# Patient Record
Sex: MALE | Race: WHITE | NOT HISPANIC OR LATINO | Employment: UNEMPLOYED | URBAN - METROPOLITAN AREA
[De-identification: names, ages, dates, MRNs, and addresses within clinical notes are randomized per-mention and may not be internally consistent; named-entity substitution may affect disease eponyms.]

---

## 2022-04-29 ENCOUNTER — APPOINTMENT (EMERGENCY)
Dept: RADIOLOGY | Facility: HOSPITAL | Age: 62
End: 2022-04-29
Payer: COMMERCIAL

## 2022-04-29 ENCOUNTER — HOSPITAL ENCOUNTER (EMERGENCY)
Facility: HOSPITAL | Age: 62
Discharge: HOME | End: 2022-04-29
Attending: EMERGENCY MEDICINE
Payer: COMMERCIAL

## 2022-04-29 VITALS
HEART RATE: 99 BPM | SYSTOLIC BLOOD PRESSURE: 199 MMHG | OXYGEN SATURATION: 98 % | TEMPERATURE: 99.2 F | RESPIRATION RATE: 20 BRPM | DIASTOLIC BLOOD PRESSURE: 103 MMHG

## 2022-04-29 DIAGNOSIS — F10.239 ALCOHOL WITHDRAWAL (HCC): Primary | ICD-10-CM

## 2022-04-29 LAB
2HR DELTA HS TROPONIN: 3 NG/L
ALBUMIN SERPL BCP-MCNC: 4.5 G/DL (ref 3.5–5)
ALP SERPL-CCNC: 123 U/L (ref 46–116)
ALT SERPL W P-5'-P-CCNC: 125 U/L (ref 12–78)
AMPHETAMINES SERPL QL SCN: NEGATIVE
ANION GAP SERPL CALCULATED.3IONS-SCNC: 12 MMOL/L (ref 4–13)
APTT PPP: 27 SECONDS (ref 23–37)
AST SERPL W P-5'-P-CCNC: 78 U/L (ref 5–45)
ATRIAL RATE: 103 BPM
ATRIAL RATE: 110 BPM
BARBITURATES UR QL: NEGATIVE
BASOPHILS # BLD AUTO: 0.07 THOUSANDS/ΜL (ref 0–0.1)
BASOPHILS NFR BLD AUTO: 1 % (ref 0–1)
BENZODIAZ UR QL: NEGATIVE
BILIRUB SERPL-MCNC: 2.08 MG/DL (ref 0.2–1)
BUN SERPL-MCNC: 13 MG/DL (ref 5–25)
CALCIUM SERPL-MCNC: 8.8 MG/DL (ref 8.3–10.1)
CARDIAC TROPONIN I PNL SERPL HS: 10 NG/L
CARDIAC TROPONIN I PNL SERPL HS: 13 NG/L
CHLORIDE SERPL-SCNC: 97 MMOL/L (ref 100–108)
CO2 SERPL-SCNC: 27 MMOL/L (ref 21–32)
COCAINE UR QL: NEGATIVE
CREAT SERPL-MCNC: 1.04 MG/DL (ref 0.6–1.3)
EOSINOPHIL # BLD AUTO: 0.01 THOUSAND/ΜL (ref 0–0.61)
EOSINOPHIL NFR BLD AUTO: 0 % (ref 0–6)
ERYTHROCYTE [DISTWIDTH] IN BLOOD BY AUTOMATED COUNT: 14.2 % (ref 11.6–15.1)
FLUAV RNA RESP QL NAA+PROBE: NEGATIVE
FLUBV RNA RESP QL NAA+PROBE: NEGATIVE
GFR SERPL CREATININE-BSD FRML MDRD: 77 ML/MIN/1.73SQ M
GLUCOSE SERPL-MCNC: 123 MG/DL (ref 65–140)
HCT VFR BLD AUTO: 49.3 % (ref 36.5–49.3)
HGB BLD-MCNC: 16.8 G/DL (ref 12–17)
IMM GRANULOCYTES # BLD AUTO: 0.02 THOUSAND/UL (ref 0–0.2)
IMM GRANULOCYTES NFR BLD AUTO: 0 % (ref 0–2)
INR PPP: 0.99 (ref 0.84–1.19)
LIPASE SERPL-CCNC: 62 U/L (ref 73–393)
LYMPHOCYTES # BLD AUTO: 1.28 THOUSANDS/ΜL (ref 0.6–4.47)
LYMPHOCYTES NFR BLD AUTO: 16 % (ref 14–44)
MCH RBC QN AUTO: 30.8 PG (ref 26.8–34.3)
MCHC RBC AUTO-ENTMCNC: 34.1 G/DL (ref 31.4–37.4)
MCV RBC AUTO: 91 FL (ref 82–98)
METHADONE UR QL: NEGATIVE
MONOCYTES # BLD AUTO: 0.9 THOUSAND/ΜL (ref 0.17–1.22)
MONOCYTES NFR BLD AUTO: 11 % (ref 4–12)
NEUTROPHILS # BLD AUTO: 5.96 THOUSANDS/ΜL (ref 1.85–7.62)
NEUTS SEG NFR BLD AUTO: 72 % (ref 43–75)
NRBC BLD AUTO-RTO: 0 /100 WBCS
OPIATES UR QL SCN: NEGATIVE
OXYCODONE+OXYMORPHONE UR QL SCN: NEGATIVE
P AXIS: 63 DEGREES
P AXIS: 65 DEGREES
PCP UR QL: NEGATIVE
PLATELET # BLD AUTO: 296 THOUSANDS/UL (ref 149–390)
PMV BLD AUTO: 9.7 FL (ref 8.9–12.7)
POTASSIUM SERPL-SCNC: 4.2 MMOL/L (ref 3.5–5.3)
PR INTERVAL: 142 MS
PR INTERVAL: 170 MS
PROT SERPL-MCNC: 8.1 G/DL (ref 6.4–8.2)
PROTHROMBIN TIME: 12.7 SECONDS (ref 11.6–14.5)
QRS AXIS: 27 DEGREES
QRS AXIS: 43 DEGREES
QRSD INTERVAL: 62 MS
QRSD INTERVAL: 74 MS
QT INTERVAL: 318 MS
QT INTERVAL: 326 MS
QTC INTERVAL: 427 MS
QTC INTERVAL: 430 MS
RBC # BLD AUTO: 5.45 MILLION/UL (ref 3.88–5.62)
RSV RNA RESP QL NAA+PROBE: NEGATIVE
SARS-COV-2 RNA RESP QL NAA+PROBE: NEGATIVE
SODIUM SERPL-SCNC: 136 MMOL/L (ref 136–145)
T WAVE AXIS: 45 DEGREES
T WAVE AXIS: 59 DEGREES
THC UR QL: NEGATIVE
VENTRICULAR RATE: 103 BPM
VENTRICULAR RATE: 110 BPM
WBC # BLD AUTO: 8.24 THOUSAND/UL (ref 4.31–10.16)

## 2022-04-29 PROCEDURE — 80053 COMPREHEN METABOLIC PANEL: CPT | Performed by: EMERGENCY MEDICINE

## 2022-04-29 PROCEDURE — 99285 EMERGENCY DEPT VISIT HI MDM: CPT

## 2022-04-29 PROCEDURE — 80307 DRUG TEST PRSMV CHEM ANLYZR: CPT | Performed by: EMERGENCY MEDICINE

## 2022-04-29 PROCEDURE — 96361 HYDRATE IV INFUSION ADD-ON: CPT

## 2022-04-29 PROCEDURE — 71046 X-RAY EXAM CHEST 2 VIEWS: CPT

## 2022-04-29 PROCEDURE — 85610 PROTHROMBIN TIME: CPT | Performed by: EMERGENCY MEDICINE

## 2022-04-29 PROCEDURE — 84484 ASSAY OF TROPONIN QUANT: CPT | Performed by: EMERGENCY MEDICINE

## 2022-04-29 PROCEDURE — 99285 EMERGENCY DEPT VISIT HI MDM: CPT | Performed by: EMERGENCY MEDICINE

## 2022-04-29 PROCEDURE — 96376 TX/PRO/DX INJ SAME DRUG ADON: CPT

## 2022-04-29 PROCEDURE — 36415 COLL VENOUS BLD VENIPUNCTURE: CPT | Performed by: EMERGENCY MEDICINE

## 2022-04-29 PROCEDURE — 85025 COMPLETE CBC W/AUTO DIFF WBC: CPT | Performed by: EMERGENCY MEDICINE

## 2022-04-29 PROCEDURE — 93010 ELECTROCARDIOGRAM REPORT: CPT | Performed by: INTERNAL MEDICINE

## 2022-04-29 PROCEDURE — 85730 THROMBOPLASTIN TIME PARTIAL: CPT | Performed by: EMERGENCY MEDICINE

## 2022-04-29 PROCEDURE — 83690 ASSAY OF LIPASE: CPT | Performed by: EMERGENCY MEDICINE

## 2022-04-29 PROCEDURE — 0241U HB NFCT DS VIR RESP RNA 4 TRGT: CPT | Performed by: EMERGENCY MEDICINE

## 2022-04-29 PROCEDURE — 96374 THER/PROPH/DIAG INJ IV PUSH: CPT

## 2022-04-29 PROCEDURE — 93005 ELECTROCARDIOGRAM TRACING: CPT

## 2022-04-29 RX ORDER — LORAZEPAM 2 MG/ML
2 INJECTION INTRAMUSCULAR ONCE
Status: COMPLETED | OUTPATIENT
Start: 2022-04-29 | End: 2022-04-29

## 2022-04-29 RX ORDER — FOLIC ACID 1 MG/1
1 TABLET ORAL DAILY
Status: DISCONTINUED | OUTPATIENT
Start: 2022-04-29 | End: 2022-04-29 | Stop reason: HOSPADM

## 2022-04-29 RX ORDER — CHLORDIAZEPOXIDE HYDROCHLORIDE 25 MG/1
CAPSULE, GELATIN COATED ORAL
Qty: 15 CAPSULE | Refills: 0 | Status: SHIPPED | OUTPATIENT
Start: 2022-04-29 | End: 2022-07-15 | Stop reason: SDUPTHER

## 2022-04-29 RX ORDER — LANOLIN ALCOHOL/MO/W.PET/CERES
100 CREAM (GRAM) TOPICAL DAILY
Status: DISCONTINUED | OUTPATIENT
Start: 2022-04-29 | End: 2022-04-29 | Stop reason: HOSPADM

## 2022-04-29 RX ADMIN — Medication 1 TABLET: at 14:25

## 2022-04-29 RX ADMIN — SODIUM CHLORIDE 1000 ML: 0.9 INJECTION, SOLUTION INTRAVENOUS at 14:18

## 2022-04-29 RX ADMIN — THIAMINE HCL TAB 100 MG 100 MG: 100 TAB at 14:25

## 2022-04-29 RX ADMIN — LORAZEPAM 2 MG: 2 INJECTION INTRAMUSCULAR; INTRAVENOUS at 14:25

## 2022-04-29 RX ADMIN — FOLIC ACID 1 MG: 1 TABLET ORAL at 14:25

## 2022-04-29 RX ADMIN — LORAZEPAM 2 MG: 2 INJECTION INTRAMUSCULAR; INTRAVENOUS at 16:08

## 2022-04-29 NOTE — ED PROVIDER NOTES
History  Chief Complaint   Patient presents with    Withdrawal - Alcohol     chest pain, chills, sweats, shakes, drinks 2 pints of vodka a day for the past 2 weeks  last drink was around 8pm last night  Has had similar symptoms before but not this bad     HPI  62 yo M presents with alcohol withdrawal  He reports that he drinks 2 pints of vodka a day and last drink was at 8 pm last night  States that he tried quitting before but relapsed  Started with chest pain, sweating, shaking, chills that started early this morning  Denies any history of withdrawal seizures in the past    None       History reviewed  No pertinent past medical history  History reviewed  No pertinent surgical history  History reviewed  No pertinent family history  I have reviewed and agree with the history as documented  E-Cigarette/Vaping     E-Cigarette/Vaping Substances     Social History     Tobacco Use    Smoking status: Never Smoker    Smokeless tobacco: Never Used   Substance Use Topics    Alcohol use: Yes     Comment: 2 pinks vodka a day    Drug use: Never       Review of Systems   Constitutional: Positive for chills  Negative for fever  HENT: Negative for dental problem and ear pain  Eyes: Negative for pain and redness  Respiratory: Negative for cough and shortness of breath  Cardiovascular: Positive for chest pain  Negative for palpitations  Gastrointestinal: Negative for abdominal pain and nausea  Endocrine: Negative for polydipsia and polyphagia  Genitourinary: Negative for dysuria and frequency  Musculoskeletal: Negative for arthralgias and joint swelling  Skin: Negative for color change and rash  Neurological: Positive for tremors  Negative for dizziness and headaches  Psychiatric/Behavioral: Negative for behavioral problems and confusion  All other systems reviewed and are negative  Physical Exam  Physical Exam  Vitals and nursing note reviewed     Constitutional:       General: He is not in acute distress  Appearance: He is well-developed  He is not diaphoretic  HENT:      Head: Atraumatic  Right Ear: External ear normal       Left Ear: External ear normal       Nose: Nose normal    Eyes:      Conjunctiva/sclera: Conjunctivae normal       Pupils: Pupils are equal, round, and reactive to light  Neck:      Vascular: No JVD  Cardiovascular:      Rate and Rhythm: Regular rhythm  Tachycardia present  Heart sounds: Normal heart sounds  No murmur heard  Pulmonary:      Effort: Pulmonary effort is normal  No respiratory distress  Breath sounds: Normal breath sounds  No wheezing  Abdominal:      General: Bowel sounds are normal  There is no distension  Palpations: Abdomen is soft  Tenderness: There is no abdominal tenderness  Musculoskeletal:         General: Normal range of motion  Cervical back: Normal range of motion and neck supple  Skin:     General: Skin is warm and dry  Capillary Refill: Capillary refill takes less than 2 seconds  Neurological:      Mental Status: He is alert and oriented to person, place, and time  Cranial Nerves: No cranial nerve deficit     Psychiatric:         Behavior: Behavior normal          Vital Signs  ED Triage Vitals [04/29/22 1337]   Temperature Pulse Respirations Blood Pressure SpO2   99 2 °F (37 3 °C) (!) 110 22 (!) 176/102 97 %      Temp Source Heart Rate Source Patient Position - Orthostatic VS BP Location FiO2 (%)   Temporal Monitor;Dorsalis pedis Sitting Left arm --      Pain Score       --           Vitals:    04/29/22 1337 04/29/22 1400 04/29/22 1500 04/29/22 1530   BP: (!) 176/102 (!) 186/98 (!) 197/104 (!) 199/103   Pulse: (!) 110 (!) 107 97 99   Patient Position - Orthostatic VS: Sitting  Lying Lying         Visual Acuity      ED Medications  Medications   thiamine tablet 100 mg (100 mg Oral Given 3/43/02 1372)   folic acid (FOLVITE) tablet 1 mg (1 mg Oral Given 4/29/22 1425)   multivitamin-minerals (CENTRUM) tablet 1 tablet (1 tablet Oral Given 4/29/22 1425)   sodium chloride 0 9 % bolus 1,000 mL (0 mL Intravenous Stopped 4/29/22 1535)   LORazepam (ATIVAN) injection 2 mg (2 mg Intravenous Given 4/29/22 1425)   LORazepam (ATIVAN) injection 2 mg (2 mg Intravenous Given 4/29/22 1608)       Diagnostic Studies  Results Reviewed     Procedure Component Value Units Date/Time    Rapid drug screen, urine [[id number]]  (Normal) Collected: 04/29/22 1649    Lab Status: Final result Specimen: Urine, Clean Catch Updated: 04/29/22 1707     Amph/Meth UR Negative     Barbiturate Ur Negative     Benzodiazepine Urine Negative     Cocaine Urine Negative     Methadone Urine Negative     Opiate Urine Negative     PCP Ur Negative     THC Urine Negative     Oxycodone Urine Negative    Narrative:      FOR MEDICAL PURPOSES ONLY  IF CONFIRMATION NEEDED PLEASE CONTACT THE LAB WITHIN 5 DAYS  Drug Screen Cutoff Levels:  AMPHETAMINE/METHAMPHETAMINES  1000 ng/mL  BARBITURATES     200 ng/mL  BENZODIAZEPINES     200 ng/mL  COCAINE      300 ng/mL  METHADONE      300 ng/mL  OPIATES      300 ng/mL  PHENCYCLIDINE     25 ng/mL  THC       50 ng/mL  OXYCODONE      100 ng/mL    HS Troponin I 2hr [[id number]]  (Normal) Collected: 04/29/22 1620    Lab Status: Final result Specimen: Blood from Arm, Left Updated: 04/29/22 1646     hs TnI 2hr 13 ng/L      Delta 2hr hsTnI 3 ng/L     HS Troponin I 4hr [[id number]]     Lab Status: No result Specimen: Blood     COVID/FLU/RSV [[id number]]  (Normal) Collected: 04/29/22 1418    Lab Status: Final result Specimen: Nares from Nose Updated: 04/29/22 1535     SARS-CoV-2 Negative     INFLUENZA A PCR Negative     INFLUENZA B PCR Negative     RSV PCR Negative    Narrative:      FOR PEDIATRIC PATIENTS - copy/paste COVID Guidelines URL to browser: [email or web address]    SARS-CoV-2 assay is a Nucleic Acid Amplification assay intended for the  qualitative detection of nucleic acid from SARS-CoV-2 in nasopharyngeal  swabs  Results are for the presumptive identification of SARS-CoV-2 RNA  Positive results are indicative of infection with SARS-CoV-2, the virus  causing COVID-19, but do not rule out bacterial infection or co-infection  with other viruses  Laboratories within the [facility] and its  territories are required to report all positive results to the appropriate  public health authorities  Negative results do not preclude SARS-CoV-2  infection and should not be used as the sole basis for treatment or other  patient management decisions  Negative results must be combined with  clinical observations, patient history, and epidemiological information  This test has not been FDA cleared or approved  This test has been authorized by FDA under an Emergency Use Authorization  (EUA)  This test is only authorized for the duration of time the  declaration that circumstances exist justifying the authorization of the  emergency use of an in vitro diagnostic tests for detection of SARS-CoV-2  virus and/or diagnosis of COVID-19 infection under section 564(b)(1) of  the Act, 21 U  S C  846HJA-0(Y)(3), unless the authorization is terminated  or revoked sooner  The test has been validated but independent review by FDA  and CLIA is pending  Test performed using [employer] Gene[employer]pert: This RT-PCR assay targets N2,  a region unique to SARS-CoV-2  A conserved region in the E-gene was chosen  for pan-Sarbecovirus detection which includes SARS-CoV-2      HS Troponin 0hr (reflex protocol) [[id number]]  (Normal) Collected: 04/29/22 1418    Lab Status: Final result Specimen: Blood from Arm, Right Updated: 04/29/22 1455     hs TnI 0hr 10 ng/L     Comprehensive metabolic panel [[id number]]  (Abnormal) Collected: 04/29/22 1418    Lab Status: Final result Specimen: Blood from Arm, Right Updated: 04/29/22 1447     Sodium 136 mmol/L      Potassium 4 2 mmol/L      Chloride 97 mmol/L      CO2 27 mmol/L ANION GAP 12 mmol/L      BUN 13 mg/dL      Creatinine 1 04 mg/dL      Glucose 123 mg/dL      Calcium 8 8 mg/dL      AST 78 U/L       U/L      Alkaline Phosphatase 123 U/L      Total Protein 8 1 g/dL      Albumin 4 5 g/dL      Total Bilirubin 2 08 mg/dL      eGFR 77 ml/min/1 73sq m     Narrative:      [place] guidelines for Chronic Kidney Disease (CKD):     Stage 1 with normal or high GFR (GFR > 90 mL/min/1 73 square meters)    Stage 2 Mild CKD (GFR = 60-89 mL/min/1 73 square meters)    Stage 3A Moderate CKD (GFR = 45-59 mL/min/1 73 square meters)    Stage 3B Moderate CKD (GFR = 30-44 mL/min/1 73 square meters)    Stage 4 Severe CKD (GFR = 15-29 mL/min/1 73 square meters)    Stage 5 End Stage CKD (GFR <15 mL/min/1 73 square meters)  Note: GFR calculation is accurate only with a steady state creatinine    Lipase [[id number]]  (Abnormal) Collected: 04/29/22 1418    Lab Status: Final result Specimen: Blood from Arm, Right Updated: 04/29/22 1447     Lipase 62 u/L     APTT [[id number]]  (Normal) Collected: 04/29/22 1418    Lab Status: Final result Specimen: Blood from Arm, Right Updated: 04/29/22 1441     PTT 27 seconds     Protime-INR [[id number]]  (Normal) Collected: 04/29/22 1418    Lab Status: Final result Specimen: Blood from Arm, Right Updated: 04/29/22 1441     Protime 12 7 seconds      INR 0 99    CBC and differential [[id number]] Collected: 04/29/22 1418    Lab Status: Final result Specimen: Blood from Arm, Right Updated: 04/29/22 1428     WBC 8 24 Thousand/uL      RBC 5 45 Million/uL      Hemoglobin 16 8 g/dL      Hematocrit 49 3 %      MCV 91 fL      MCH 30 8 pg      MCHC 34 1 g/dL      RDW 14 2 %      MPV 9 7 fL      Platelets 930 Thousands/uL      nRBC 0 /100 WBCs      Neutrophils Relative 72 %      Immat GRANS % 0 %      Lymphocytes Relative 16 %      Monocytes Relative 11 %      Eosinophils Relative 0 %      Basophils Relative 1 %      Neutrophils Absolute 5 96 Thousands/µL      Immature Grans Absolute 0 02 Thousand/uL      Lymphocytes Absolute 1 28 Thousands/µL      Monocytes Absolute 0 90 Thousand/µL      Eosinophils Absolute 0 01 Thousand/µL      Basophils Absolute 0 07 Thousands/µL                  XR chest pa & lateral   Final Result by Matthew Stone MD (04/29 1550)      No acute cardiopulmonary disease  Workstation performed: [id number]                    Procedures  ECG 12 Lead Documentation Only    Date/Time: 4/29/2022 2:41 PM  Performed by: Trina Ovalle MD  Authorized by: Trina Ovalle MD     Comments:      Sinus tachycardia rate of 103 normal axis and intervals no acute ST elevations or depressions             ED Course                               SBIRT 22yo+      Most Recent Value   SBIRT (25 yo +)    In order to provide better care to our patients, we are screening all of our patients for alcohol and drug use  Would it be okay to ask you these screening questions? Yes Filed at: 04/29/2022 1501   Initial Alcohol Screen: US AUDIT-C     1  How often do you have a drink containing alcohol? 6 Filed at: 04/29/2022 1501   2  How many drinks containing alcohol do you have on a typical day you are drinking? 6 Filed at: 04/29/2022 1501   3a  Male UNDER 65: How often do you have five or more drinks on one occasion? 6 Filed at: 04/29/2022 1501   3b  FEMALE Any Age, or MALE 65+: How often do you have 4 or more drinks on one occassion? 0 Filed at: 04/29/2022 1501   Audit-C Score 18 Filed at: 04/29/2022 1501   Full Alcohol Screen: US AUDIT    4  How often during the last year have you found that you were not able to stop drinking once you had started? 0 Filed at: 04/29/2022 1501   5  How often during past year have you failed to do what was normally expected of you because of drinking? 0 Filed at: 04/29/2022 1501   6  How often in past year have you needed a first drink in the morning to get yourself going after a heavy drinking session?   0 Filed at: 04/29/2022 1501   7  How often in past year have you had feeling of guilt or remorse after drinking? 0 Filed at: 04/29/2022 1501   8  How often in past year have you been unable to remember what happened night before because you had been drinking? 0 Filed at: 04/29/2022 1501   9  Have you or someone else been injured as a result of your drinking? 0 Filed at: 04/29/2022 1501   10  Has a relative, friend, doctor or other health worker been concerned about your drinking and suggested you cut down?  0 Filed at: 04/29/2022 1501   AUDIT Total Score 18 Filed at: 04/29/2022 1501   SVETLANA: How many times in the past year have you    Used an illegal drug or used a prescription medication for non-medical reasons? Never Filed at: 04/29/2022 1501                    MDM  Patient presents with alcohol withdrawal  Symptoms improved with ativan in the ED  ED crisis has evaluated patient, warm handoff given  Patient declines alcohol detox admission and would like to be discharged  He is amenable to librium taper  Discussed that he cannot drink alcohol while taking this and he may return to ED at any time if symptoms worsen  Disposition  Final diagnoses:   Alcohol withdrawal ([address] )     Time reflects when diagnosis was documented in both MDM as applicable and the Disposition within this note     Time User Action Codes Description Comment    4/29/2022  5:41 PM Rosaura Block Add [F10 [id number]] Alcohol withdrawal [facility])       ED Disposition     ED Disposition Condition Date/Time Comment    Discharge Stable Fri Apr 29, 2022  5:41 PM Tiarra Das discharge to home/self care              Follow-up Information     Follow up With Specialties Details Why Contact Info Additional Information    [facility] Emergency Department Emergency Medicine  As needed [address] [facility] [id number] [facility] Emergency Department, [address] [place] [address], [facility] Discharge Medication List as of 4/29/2022  5:48 PM      START taking these medications    Details   chlordiazePOXIDE (LIBRIUM) 25 mg capsule Multiple Dosages:Starting Fri 4/29/2022, Until Fri 4/29/2022 at 2359, THEN Starting Sat 4/30/2022, Until Sat 4/30/2022 at 2359, THEN Starting Sun 5/1/2022, Until Sun 5/1/2022 at 2359, THEN Starting Mon 5/2/2022, Until Mon 5/2/2022 at 2359Take 2 caps ules (50 mg total) by mouth every 6 (six) hours for 1 day, THEN 1 capsule (25 mg total) every 6 (six) hours for 1 day, THEN 1 capsule (25 mg total) every 12 (twelve) hours for 1 day, THEN 1 capsule (25 mg total) daily at bedtime for 1 day , Print             No discharge procedures on file      PDMP Review     None          ED Provider  Electronically Signed by           Karlee Luna MD  04/29/22 9857

## 2022-04-29 NOTE — DISCHARGE INSTRUCTIONS
Follow up with resources as provided by ED crisis and take medication as prescribed for withdrawal, DO NOT TAKE with alcohol   Return to ED at any time if worse

## 2022-07-15 ENCOUNTER — HOSPITAL ENCOUNTER (EMERGENCY)
Facility: HOSPITAL | Age: 62
Discharge: HOME | End: 2022-07-15
Attending: EMERGENCY MEDICINE
Payer: COMMERCIAL

## 2022-07-15 VITALS
RESPIRATION RATE: 16 BRPM | OXYGEN SATURATION: 98 % | SYSTOLIC BLOOD PRESSURE: 146 MMHG | DIASTOLIC BLOOD PRESSURE: 91 MMHG | TEMPERATURE: 98 F | HEART RATE: 71 BPM

## 2022-07-15 DIAGNOSIS — F10.929 ALCOHOL INTOXICATION (HCC): Primary | ICD-10-CM

## 2022-07-15 DIAGNOSIS — Z71.41 ALCOHOL CESSATION COUNSELING: ICD-10-CM

## 2022-07-15 DIAGNOSIS — F10.939 ALCOHOL WITHDRAWAL (HCC): ICD-10-CM

## 2022-07-15 LAB — ETHANOL EXG-MCNC: 0.2 MG/DL

## 2022-07-15 PROCEDURE — 99284 EMERGENCY DEPT VISIT MOD MDM: CPT | Performed by: EMERGENCY MEDICINE

## 2022-07-15 PROCEDURE — 99284 EMERGENCY DEPT VISIT MOD MDM: CPT

## 2022-07-15 PROCEDURE — 82075 ASSAY OF BREATH ETHANOL: CPT | Performed by: EMERGENCY MEDICINE

## 2022-07-15 RX ORDER — CHLORDIAZEPOXIDE HYDROCHLORIDE 25 MG/1
CAPSULE, GELATIN COATED ORAL
Qty: 15 CAPSULE | Refills: 0 | Status: SHIPPED | OUTPATIENT
Start: 2022-07-15 | End: 2022-07-19

## 2022-07-15 NOTE — DISCHARGE INSTRUCTIONS
Drink while you are taking the Librium  Follow-up for further help with your drinking as an outpatient  Return if you feel like you need more help stopping drinking, or for any other concerns

## 2022-07-15 NOTE — ED NOTES
Patient for discharge  No acute distress noted  All questions answered and DC papers given  Ambulatory out of department without difficulty, friend picking up patient              Floyd Daly RN  07/15/22 0565

## 2022-07-15 NOTE — ED PROVIDER NOTES
History  Chief Complaint   Patient presents with    Alcohol Intoxication     Patient states"he is intoxicated and wants to stop drinking"     HPI    Prior to Admission Medications   Prescriptions Last Dose Informant Patient Reported? Taking?   chlordiazePOXIDE (LIBRIUM) 25 mg capsule   No No   Sig: Take 2 capsules (50 mg total) by mouth every 6 (six) hours for 1 day, THEN 1 capsule (25 mg total) every 6 (six) hours for 1 day, THEN 1 capsule (25 mg total) every 12 (twelve) hours for 1 day, THEN 1 capsule (25 mg total) daily at bedtime for 1 day  chlordiazePOXIDE (LIBRIUM) 25 mg capsule   No Yes   Sig: Take 2 capsules (50 mg total) by mouth every 6 (six) hours for 1 day, THEN 1 capsule (25 mg total) every 6 (six) hours for 1 day, THEN 1 capsule (25 mg total) every 12 (twelve) hours for 1 day, THEN 1 capsule (25 mg total) daily at bedtime for 1 day  Facility-Administered Medications: None       Past Medical History:   Diagnosis Date    Alcohol abuse        History reviewed  No pertinent surgical history  History reviewed  No pertinent family history  I have reviewed and agree with the history as documented  E-Cigarette/Vaping    E-Cigarette Use Never User      E-Cigarette/Vaping Substances     Social History     Tobacco Use    Smoking status: Never Smoker    Smokeless tobacco: Never Used   Vaping Use    Vaping Use: Never used   Substance Use Topics    Alcohol use: Yes     Comment: 2 pinks vodka a day    Drug use: Never       Review of Systems    Physical Exam  Physical Exam  Vitals and nursing note reviewed  Constitutional:       General: He is not in acute distress  Appearance: He is well-developed  HENT:      Head: Normocephalic and atraumatic  Comments: No tremulousness  Clinically sober  Eyes:      Conjunctiva/sclera: Conjunctivae normal       Pupils: Pupils are equal, round, and reactive to light  Neck:      Trachea: No tracheal deviation     Cardiovascular:      Rate and Rhythm: Normal rate and regular rhythm  Heart sounds: Normal heart sounds  Pulmonary:      Effort: Pulmonary effort is normal  No respiratory distress  Breath sounds: Normal breath sounds  Abdominal:      General: There is no distension  Palpations: Abdomen is soft  Tenderness: There is no abdominal tenderness  Musculoskeletal:      Cervical back: Normal range of motion  Skin:     General: Skin is warm and dry  Neurological:      Mental Status: He is alert and oriented to person, place, and time  GCS: GCS eye subscore is 4  GCS verbal subscore is 5  GCS motor subscore is 6  Motor: No tremor  Psychiatric:         Mood and Affect: Mood is not anxious  Behavior: Behavior normal          Vital Signs  ED Triage Vitals [07/15/22 1111]   Temperature Pulse Respirations Blood Pressure SpO2   98 °F (36 7 °C) 74 20 154/88 96 %      Temp src Heart Rate Source Patient Position - Orthostatic VS BP Location FiO2 (%)   -- -- -- -- --      Pain Score       No Pain           Vitals:    07/15/22 1111   BP: 154/88   Pulse: 74         Visual Acuity      ED Medications  Medications - No data to display    Diagnostic Studies  Results Reviewed     Procedure Component Value Units Date/Time    POCT alcohol breath test [[id number]]  (Normal) Resulted: 07/15/22 1216    Lab Status: Final result Updated: 07/15/22 1216     EXTBreath Alcohol 0 195                 No orders to display              Procedures  Procedures         ED Course                               SBIRT 22yo+    Flowsheet Row Most Recent Value   SBIRT (23 yo +)    In order to provide better care to our patients, we are screening all of our patients for alcohol and drug use  Would it be okay to ask you these screening questions? Yes Filed at: 07/15/2022 1203   Initial Alcohol Screen: US AUDIT-C     1  How often do you have a drink containing alcohol? 6 Filed at: 07/15/2022 1203   2   How many drinks containing alcohol do you have on a typical day you are drinking? 3 Filed at: 07/15/2022 1203   3a  Male UNDER 65: How often do you have five or more drinks on one occasion? 4 Filed at: 07/15/2022 1203   Audit-C Score 13 Filed at: 07/15/2022 1203   Full Alcohol Screen: US AUDIT    4  How often during the last year have you found that you were not able to stop drinking once you had started? 4 Filed at: 07/15/2022 1203   5  How often during past year have you failed to do what was normally expected of you because of drinking? 4 Filed at: 07/15/2022 1203   6  How often in past year have you needed a first drink in the morning to get yourself going after a heavy drinking session? 4 Filed at: 07/15/2022 1203   7  How often in past year have you had feeling of guilt or remorse after drinking? 4 Filed at: 07/15/2022 1203   8  How often in past year have you been unable to remember what happened night before because you had been drinking? 4 Filed at: 07/15/2022 1203   9  Have you or someone else been injured as a result of your drinking? 0 Filed at: 07/15/2022 1203   10  Has a relative, friend, doctor or other health worker been concerned about your drinking and suggested you cut down? 4 Filed at: 07/15/2022 1203   AUDIT Total Score 37 Filed at: 07/15/2022 1203   SVETLANA: How many times in the past year have you    Used an illegal drug or used a prescription medication for non-medical reasons? Never Filed at: 07/15/2022 1203                    MDM  Number of Diagnoses or Management Options  Alcohol cessation counseling: new and does not require workup  Alcohol intoxication [facility]): new and requires workup  Diagnosis management comments: This is a 30-year-old male who presents for evaluation of alcohol intoxication and for help to stop drinking  He said he had been sober for several months, got into an argument with his wife one week ago and that started drinking again  He says he has been drinking about a pint today    He says this morning he has had several drinks prior to coming in  He had a cyst previously has had bad she takes and anxiety however denies prior hallucinations or seizures  He says years ago he went to an inpatient treatment center but most recently has been doing outpatient  He does currently have a sponsor  He denies current onset of withdrawal symptoms or any other complaints  He says he is requesting help to stop drinking but is not interested in inpatient placement  Review of systems:  Otherwise negative above    He is well no acute distress  He has no shaking or jitteriness, anxiety other symptoms alcohol withdrawal   He appears clinically sober  Exam is otherwise unremarkable  We will have him evaluated by crisis for warm handoff to get further help as an outpatient  The patient told the nurse he does not need further help with outpatient resources, however once the medication to get the alcohol out of my system    I went to go talk to the patient and advised him there is no such medication to help him get sober, however he had been prescribed Librium the last time he was in the emergency department as an outpatient and does see this helps with his symptoms  He is interested in this again  I advised him not to drink alcohol while taking this  I discussed with him follow-up and indications for return, and he expresses understanding with this plan         Amount and/or Complexity of Data Reviewed  Clinical lab tests: ordered and reviewed        Disposition  Final diagnoses:   Alcohol intoxication ([facility] )   Alcohol cessation counseling     Time reflects when diagnosis was documented in both MDM as applicable and the Disposition within this note     Time User Action Codes Description Comment    7/15/2022 12:45 PM Marni Patel Add [F10 929] Alcohol intoxication ([facility] [address] )     7/15/2022 12:45 PM Marni Patel Add [Z71 41] Alcohol cessation counseling     7/15/2022 12:46 PM Marni Patel Add [F10 239] Alcohol withdrawal [facility])       ED Disposition     ED Disposition   Discharge    Condition   Fair    Date/Time   Fri Jul 15, 2022 Avi LLOYD discharge to home/self care  Follow-up Information     Follow up With Specialties Details Why Contact Info    Annette Hay MD Sleep Medicine, Family Medicine Schedule an appointment as soon as possible for a visit  to follow up, and for further help with your alcohol use [address] [address] [facility] [address]  [phone]            Current Discharge Medication List      CONTINUE these medications which have CHANGED    Details   chlordiazePOXIDE (LIBRIUM) 25 mg capsule Take 2 capsules (50 mg total) by mouth every 6 (six) hours for 1 day, THEN 1 capsule (25 mg total) every 6 (six) hours for 1 day, THEN 1 capsule (25 mg total) every 12 (twelve) hours for 1 day, THEN 1 capsule (25 mg total) daily at bedtime for 1 day  Qty: 15 capsule, Refills: 0    Associated Diagnoses: Alcohol withdrawal ([facility] [address] )             No discharge procedures on file      PDMP Review     None          ED Provider  Electronically Signed by           Daily Hurd MD  07/15/22 7921